# Patient Record
Sex: MALE | Race: BLACK OR AFRICAN AMERICAN | NOT HISPANIC OR LATINO | URBAN - METROPOLITAN AREA
[De-identification: names, ages, dates, MRNs, and addresses within clinical notes are randomized per-mention and may not be internally consistent; named-entity substitution may affect disease eponyms.]

---

## 2019-02-04 ENCOUNTER — EMERGENCY (EMERGENCY)
Facility: HOSPITAL | Age: 19
LOS: 1 days | Discharge: HOME | End: 2019-02-04
Attending: EMERGENCY MEDICINE
Payer: COMMERCIAL

## 2019-02-04 VITALS
DIASTOLIC BLOOD PRESSURE: 84 MMHG | TEMPERATURE: 99 F | SYSTOLIC BLOOD PRESSURE: 125 MMHG | RESPIRATION RATE: 16 BRPM | OXYGEN SATURATION: 98 % | HEART RATE: 79 BPM

## 2019-02-04 VITALS
RESPIRATION RATE: 18 BRPM | OXYGEN SATURATION: 99 % | HEIGHT: 67 IN | TEMPERATURE: 98 F | HEART RATE: 88 BPM | SYSTOLIC BLOOD PRESSURE: 123 MMHG | WEIGHT: 138.01 LBS | DIASTOLIC BLOOD PRESSURE: 87 MMHG

## 2019-02-04 PROBLEM — Z00.00 ENCOUNTER FOR PREVENTIVE HEALTH EXAMINATION: Status: ACTIVE | Noted: 2019-02-04

## 2019-02-04 PROCEDURE — 99283 EMERGENCY DEPT VISIT LOW MDM: CPT | Mod: 25

## 2019-02-04 PROCEDURE — 99053 MED SERV 10PM-8AM 24 HR FAC: CPT

## 2019-02-04 PROCEDURE — 99283 EMERGENCY DEPT VISIT LOW MDM: CPT

## 2019-02-04 RX ORDER — ONDANSETRON 8 MG/1
4 TABLET, FILM COATED ORAL ONCE
Qty: 0 | Refills: 0 | Status: COMPLETED | OUTPATIENT
Start: 2019-02-04 | End: 2019-02-04

## 2019-02-04 RX ORDER — ONDANSETRON 8 MG/1
1 TABLET, FILM COATED ORAL
Qty: 9 | Refills: 0 | OUTPATIENT
Start: 2019-02-04 | End: 2019-02-06

## 2019-02-04 RX ADMIN — ONDANSETRON 4 MILLIGRAM(S): 8 TABLET, FILM COATED ORAL at 03:17

## 2019-02-04 NOTE — ED ADULT NURSE NOTE - OBJECTIVE STATEMENT
18 year old male presents to the ED via walk in with c/o abd pain. Pt states he ate "3 month old chicken" on friday, and has been having nausea and vomiting ever since. Pt vomited approx 4 x this weekend with fever and chills and not tolerating much PO intake. Abd is soft, nondistended and non tender upon exam. Denies c/o CP, SOB, back pain, HA or dizziness. Comfort and safety measures maintained.

## 2019-02-04 NOTE — ED PROVIDER NOTE - ATTENDING CONTRIBUTION TO CARE
18 yom no pmhx no surg hx p/w few days of n/v/d - states sxs have mostly resolved however his concern is that he is still having mild abd bloating and diminished appetite/ states 'my stomach doesn't feel normal when I try to eat and i'm concerned i'm not getting enough calories.' no f/c. no ongoing vomiting or diarrhea    ROS:   constitutional - no fever, no chills  eyes - no visual changes, no redness  eent - no sore throat, no nasal congestion  cvs - no chest pain, no leg swelling  resp - no shortness of breath, no cough  gi - no abdominal pain, + resolved vomiting, + resolved diarrhea  gu - no dysuria, no hematuria  msk - no acute back pain, no joint swelling  skin - no rashes, no jaundice  neuro - no headache, no focal weakness  psych - no acute mental health issue     Physical Exam:   constitutional - well appearing, awake and alert, oriented x3  head - no external evidence of trauma  cvs - rrr, no murmurs, no peripheral edema  resp - breath sounds clear and equal bilat  gi - abdomen soft and nontender, no rigidity, guarding or rebound, bowel sounds present  msk - moving all extremities spontaneously  neuro - alert and oriented x3, no focal deficits, CNs 2-12 grossly intact  skin- no jaundice, warm and dry  psych - mood and affect wnl, no apparent risk to self or others     pt very well appearing ; likely mild residual GI irritation from recent bout of gastroent - pt states he's been eating rotisserie chicken; counseled re need for bland diet and fluids, reassurance. additional verbal instructions regarding diagnosis, return precautions and follow up plan given to pt and/or family. JOHN Julien MD

## 2019-02-04 NOTE — ED PROVIDER NOTE - OBJECTIVE STATEMENT
18M presenting with abd discomfort/ bloating. Pt endorsed to n/v/d since Friday (about 15 episodes of watery, non bloody diarrhea per day and about 5 episodes of vomiting, NBNB per day). Pt came today because he continues to have nausea and still not tolerating PO. Pt endorsed to being bloated / abd discomfort with food. No fever endorsed. No pain endorsed in RLQ or RUQ area.

## 2019-02-04 NOTE — ED PROVIDER NOTE - MEDICAL DECISION MAKING DETAILS
18M with recent GI sxs including n/v/d since Friday, now complaining of nausea and abd bloating. LBM was yesterday. Physical exam not concerned for acute cholecystitis or appendicitis. Will manage nausea and PO challenge.

## 2019-02-04 NOTE — ED PROVIDER NOTE - NSFOLLOWUPINSTRUCTIONS_ED_ALL_ED_FT
- Take Zofran as needed for nausea  - Start light diet such as chicken soup, rice, banana.   - Return to the ED with any worsening of the symptoms.   - Hydrate well by drinking plenty of water.

## 2019-04-18 ENCOUNTER — EMERGENCY (EMERGENCY)
Facility: HOSPITAL | Age: 19
LOS: 1 days | Discharge: HOME | End: 2019-04-18
Attending: EMERGENCY MEDICINE
Payer: COMMERCIAL

## 2019-04-18 VITALS
OXYGEN SATURATION: 99 % | RESPIRATION RATE: 20 BRPM | WEIGHT: 139.99 LBS | SYSTOLIC BLOOD PRESSURE: 144 MMHG | HEART RATE: 100 BPM | TEMPERATURE: 99 F | DIASTOLIC BLOOD PRESSURE: 87 MMHG | HEIGHT: 66 IN

## 2019-04-18 LAB
ANION GAP SERPL CALC-SCNC: 12 MMOL/L — SIGNIFICANT CHANGE UP (ref 5–17)
APPEARANCE UR: CLEAR — SIGNIFICANT CHANGE UP
BACTERIA # UR AUTO: 0 — SIGNIFICANT CHANGE UP
BASOPHILS # BLD AUTO: 0.1 K/UL — SIGNIFICANT CHANGE UP (ref 0–0.2)
BASOPHILS NFR BLD AUTO: 0.8 % — SIGNIFICANT CHANGE UP (ref 0–2)
BILIRUB UR-MCNC: NEGATIVE — SIGNIFICANT CHANGE UP
BUN SERPL-MCNC: 12 MG/DL — SIGNIFICANT CHANGE UP (ref 7–23)
CALCIUM SERPL-MCNC: 9.4 MG/DL — SIGNIFICANT CHANGE UP (ref 8.4–10.5)
CHLORIDE SERPL-SCNC: 99 MMOL/L — SIGNIFICANT CHANGE UP (ref 96–108)
CO2 SERPL-SCNC: 26 MMOL/L — SIGNIFICANT CHANGE UP (ref 22–31)
COLOR SPEC: SIGNIFICANT CHANGE UP
CREAT SERPL-MCNC: 0.95 MG/DL — SIGNIFICANT CHANGE UP (ref 0.5–1.3)
DIFF PNL FLD: NEGATIVE — SIGNIFICANT CHANGE UP
EOSINOPHIL # BLD AUTO: 0.1 K/UL — SIGNIFICANT CHANGE UP (ref 0–0.5)
EOSINOPHIL NFR BLD AUTO: 1.7 % — SIGNIFICANT CHANGE UP (ref 0–6)
EPI CELLS # UR: 0 /HPF — SIGNIFICANT CHANGE UP
GLUCOSE SERPL-MCNC: 104 MG/DL — HIGH (ref 70–99)
GLUCOSE UR QL: NEGATIVE — SIGNIFICANT CHANGE UP
HCT VFR BLD CALC: 45.7 % — SIGNIFICANT CHANGE UP (ref 39–50)
HGB BLD-MCNC: 15.6 G/DL — SIGNIFICANT CHANGE UP (ref 13–17)
HYALINE CASTS # UR AUTO: 0 /LPF — SIGNIFICANT CHANGE UP (ref 0–2)
KETONES UR-MCNC: ABNORMAL
LEUKOCYTE ESTERASE UR-ACNC: NEGATIVE — SIGNIFICANT CHANGE UP
LYMPHOCYTES # BLD AUTO: 2.5 K/UL — SIGNIFICANT CHANGE UP (ref 1–3.3)
LYMPHOCYTES # BLD AUTO: 35.8 % — SIGNIFICANT CHANGE UP (ref 13–44)
MCHC RBC-ENTMCNC: 29.8 PG — SIGNIFICANT CHANGE UP (ref 27–34)
MCHC RBC-ENTMCNC: 34.1 GM/DL — SIGNIFICANT CHANGE UP (ref 32–36)
MCV RBC AUTO: 87.4 FL — SIGNIFICANT CHANGE UP (ref 80–100)
MONOCYTES # BLD AUTO: 0.5 K/UL — SIGNIFICANT CHANGE UP (ref 0–0.9)
MONOCYTES NFR BLD AUTO: 7.1 % — SIGNIFICANT CHANGE UP (ref 2–14)
NEUTROPHILS # BLD AUTO: 3.9 K/UL — SIGNIFICANT CHANGE UP (ref 1.8–7.4)
NEUTROPHILS NFR BLD AUTO: 54.6 % — SIGNIFICANT CHANGE UP (ref 43–77)
NITRITE UR-MCNC: NEGATIVE — SIGNIFICANT CHANGE UP
PH UR: 6 — SIGNIFICANT CHANGE UP (ref 5–8)
PLATELET # BLD AUTO: 270 K/UL — SIGNIFICANT CHANGE UP (ref 150–400)
POTASSIUM SERPL-MCNC: 3.6 MMOL/L — SIGNIFICANT CHANGE UP (ref 3.5–5.3)
POTASSIUM SERPL-SCNC: 3.6 MMOL/L — SIGNIFICANT CHANGE UP (ref 3.5–5.3)
PROT UR-MCNC: SIGNIFICANT CHANGE UP
RBC # BLD: 5.23 M/UL — SIGNIFICANT CHANGE UP (ref 4.2–5.8)
RBC # FLD: 12.3 % — SIGNIFICANT CHANGE UP (ref 10.3–14.5)
RBC CASTS # UR COMP ASSIST: 1 /HPF — SIGNIFICANT CHANGE UP (ref 0–4)
SODIUM SERPL-SCNC: 137 MMOL/L — SIGNIFICANT CHANGE UP (ref 135–145)
SP GR SPEC: 1.02 — SIGNIFICANT CHANGE UP (ref 1.01–1.02)
UROBILINOGEN FLD QL: NEGATIVE — SIGNIFICANT CHANGE UP
WBC # BLD: 7.1 K/UL — SIGNIFICANT CHANGE UP (ref 3.8–10.5)
WBC # FLD AUTO: 7.1 K/UL — SIGNIFICANT CHANGE UP (ref 3.8–10.5)
WBC UR QL: 1 /HPF — SIGNIFICANT CHANGE UP (ref 0–5)

## 2019-04-18 PROCEDURE — 81001 URINALYSIS AUTO W/SCOPE: CPT

## 2019-04-18 PROCEDURE — 99284 EMERGENCY DEPT VISIT MOD MDM: CPT

## 2019-04-18 PROCEDURE — 87591 N.GONORRHOEAE DNA AMP PROB: CPT

## 2019-04-18 PROCEDURE — 85027 COMPLETE CBC AUTOMATED: CPT

## 2019-04-18 PROCEDURE — 99283 EMERGENCY DEPT VISIT LOW MDM: CPT | Mod: 25

## 2019-04-18 PROCEDURE — 87086 URINE CULTURE/COLONY COUNT: CPT

## 2019-04-18 PROCEDURE — 87491 CHLMYD TRACH DNA AMP PROBE: CPT

## 2019-04-18 PROCEDURE — 80048 BASIC METABOLIC PNL TOTAL CA: CPT

## 2019-04-18 NOTE — ED PROVIDER NOTE - NS ED ROS FT
General: no fevers or chills  Head: no headache  Eyes: no vision change  ENT: no neck pain  CV: no chest pain  Resp: no SOB, no cough  GI: +1 episode nbnb emesis, 1 episode loose stool, +suprapubic pain  : +frequency, no rash, no discharge, no dysuria  MSK: no joint pain, no back pain  Skin: no rash  Neuro: no focal weakness, no change in sensation

## 2019-04-18 NOTE — ED ADULT NURSE NOTE - OBJECTIVE STATEMENT
18 year old male presents to the ED via walk in with c/o suprapubic pain x 2 days. Patient states he was seen by a urologist and started on doxycycline, but is  in suprapubic area with urinary frequency. Denies c/o hematuria, CP, SOB, N/V/D at this time. Comfort and safety measures maintained.

## 2019-04-18 NOTE — ED ADULT NURSE REASSESSMENT NOTE - NS ED NURSE REASSESS COMMENT FT1
Pt states he had a successful urination, unsure of amount but states it was more than a few drops like it has been. Post void-bladder scan completed, 56cc noted.

## 2019-04-18 NOTE — ED PROVIDER NOTE - NSFOLLOWUPINSTRUCTIONS_ED_ALL_ED_FT
Take your bactrim antibiotic as prescribed by your doctor and follow up with your urologist within 24 hours. Stay hydrated by drinking plenty of water. Take Motrin and/or Tylenol as needed for pain. Follow the dosing instructions on the packaging. Return to the ER promptly for any urgent concerns.

## 2019-04-18 NOTE — ED PROVIDER NOTE - OBJECTIVE STATEMENT
19yo man presents with increase urinary frequency and suprapubic pain x 4 days with worsening symptoms x 1 day. Pt reports he had intermittent perineal discomfort over the past months and was seen by a urologist 6 days ago and was evaluated with ultrasound of testes, bladder, had prostate measured, and urine tested with no findings. He was sent home with doxy BID and when he started the doxy 4 days ago, he began to have urinary frequency and suprapubic pain. He also had an episode of nbnb emesis and 1 loose stool after starting doxy but has not had any since the initial dose. Pt reports pain worsened yesterday and called urology who switched his antibiotics to bactrim but he has not started taking them. He denies hematuria or flank pain. No fevers or chills. He is sexually active with 1 partner and consistently uses protection. He has not noticed any pain in testes and no overlying rash on genitals.

## 2019-04-18 NOTE — ED PROVIDER NOTE - ATTENDING CONTRIBUTION TO CARE
18M, no sig pmh, with urinary frequency, suprapubic pain x 4 days. Seen by urologist several days ago, evaluated with US, had urine tested, d/c'd with doxycycline. Pt subsequently develoepd suprapubic pain, then was changed to bactrim by urologist. One episode nbnb vomiting, one loose stool, no melena, heatochezia. No hematuria. No flank pain, hematuria, f/c, cough, congestion, cp, sob. No penile d/c, no hx std, no rash, sexually active with one female partner.     PE: NAD, NCAT, MMM, Trachea midline, Normal conjunctiva, lungs CTAB, S1/S2 RRR, Normal perfusion, 2+ radial pulses bilat, Abdomen Soft, Minimal suprapubic ttp, No rebound/guarding, No LE edema, No deformity of extremities, No rashes,  No focal motor or sensory deficits.    Rectal exam revealed ?enlarged prostate but no ttp. Ddx includes but not limited to cystitis, much lower suspicion kidney stone, prostatitis. No ttp around mcburney's point, ttp only suprapubic, very low suspicion appendicitis, diverticulitis, or other acute gi pathology. Plan to obtain labs eval for leukocytosis, metabolic disturbance, gyn/chlamydia screen. Plan for likely d/c with outpatient urology f/u. - Patrice Levy MD

## 2019-04-18 NOTE — ED PROVIDER NOTE - CLINICAL SUMMARY MEDICAL DECISION MAKING FREE TEXT BOX
19yo man presents with increased urinary frequency with sensation of inability to completely void with suprapubic tenderness on exam with normal urologic evaluation done 6 days ago on antibiotics. Will recheck urine for possible cystitis. Will continue to monitor and reassess.

## 2019-04-18 NOTE — ED PROVIDER NOTE - PHYSICAL EXAMINATION
General: well-appearing young man in no acute distress  Head: normocephalic, atraumatic  Eyes: PERRL  Mouth: moist mucous membranes  Neck: supple neck  CV: normal rate and rhythm  Respiratory: clear to auscultation bilaterally  Abdomen: suprapubic tenderness to palpation, soft, nondistended  :  Back: no midline tenderness to palpation, no CVAT  Neuro: alert and oriented x3, speech clear, moving all extremities  Skin:   Extremities: no edema, peripheral pulses 2+ bilaterally General: well-appearing young man in no acute distress  Head: normocephalic, atraumatic  Eyes: PERRL  Mouth: moist mucous membranes  Neck: supple neck  CV: normal rate and rhythm  Respiratory: clear to auscultation bilaterally  Abdomen: suprapubic tenderness to palpation, soft, nondistended  : with Mildred and Ruddy, circumcised, no inguinal hernia, no testicular tenderness to palpation, elicited cremasteric reflex bilaterally  Rectal: with Milderd and Ruddy, enlarged prostate on exam and firm and no irregularities  Back: no midline tenderness to palpation, no CVAT  Neuro: alert and oriented x3, speech clear, moving all extremities  Skin: no rash on genitals or abdomen  Extremities: no edema, peripheral pulses 2+ bilaterally

## 2019-04-19 VITALS
DIASTOLIC BLOOD PRESSURE: 77 MMHG | TEMPERATURE: 99 F | OXYGEN SATURATION: 98 % | SYSTOLIC BLOOD PRESSURE: 111 MMHG | RESPIRATION RATE: 16 BRPM | HEART RATE: 74 BPM

## 2019-04-19 LAB
C TRACH RRNA SPEC QL NAA+PROBE: SIGNIFICANT CHANGE UP
CULTURE RESULTS: NO GROWTH — SIGNIFICANT CHANGE UP
N GONORRHOEA RRNA SPEC QL NAA+PROBE: SIGNIFICANT CHANGE UP
SPECIMEN SOURCE: SIGNIFICANT CHANGE UP
SPECIMEN SOURCE: SIGNIFICANT CHANGE UP

## 2020-09-28 ENCOUNTER — EMERGENCY (EMERGENCY)
Facility: HOSPITAL | Age: 20
LOS: 1 days | Discharge: HOME | End: 2020-09-28
Attending: STUDENT IN AN ORGANIZED HEALTH CARE EDUCATION/TRAINING PROGRAM
Payer: COMMERCIAL

## 2020-09-28 VITALS
TEMPERATURE: 98 F | HEART RATE: 95 BPM | RESPIRATION RATE: 16 BRPM | OXYGEN SATURATION: 100 % | SYSTOLIC BLOOD PRESSURE: 131 MMHG | DIASTOLIC BLOOD PRESSURE: 84 MMHG

## 2020-09-28 VITALS
OXYGEN SATURATION: 100 % | HEIGHT: 66 IN | SYSTOLIC BLOOD PRESSURE: 123 MMHG | RESPIRATION RATE: 16 BRPM | WEIGHT: 139.99 LBS | TEMPERATURE: 99 F | HEART RATE: 102 BPM | DIASTOLIC BLOOD PRESSURE: 86 MMHG

## 2020-09-28 PROCEDURE — 12011 RPR F/E/E/N/L/M 2.5 CM/<: CPT

## 2020-09-28 PROCEDURE — 73090 X-RAY EXAM OF FOREARM: CPT

## 2020-09-28 PROCEDURE — 70450 CT HEAD/BRAIN W/O DYE: CPT | Mod: 26

## 2020-09-28 PROCEDURE — 73130 X-RAY EXAM OF HAND: CPT | Mod: 26,RT

## 2020-09-28 PROCEDURE — 99284 EMERGENCY DEPT VISIT MOD MDM: CPT | Mod: 25

## 2020-09-28 PROCEDURE — 73110 X-RAY EXAM OF WRIST: CPT | Mod: 26,RT

## 2020-09-28 PROCEDURE — 73130 X-RAY EXAM OF HAND: CPT

## 2020-09-28 PROCEDURE — 73080 X-RAY EXAM OF ELBOW: CPT

## 2020-09-28 PROCEDURE — 73090 X-RAY EXAM OF FOREARM: CPT | Mod: 26,LT

## 2020-09-28 PROCEDURE — 99285 EMERGENCY DEPT VISIT HI MDM: CPT | Mod: 25

## 2020-09-28 PROCEDURE — 73080 X-RAY EXAM OF ELBOW: CPT | Mod: 26,LT

## 2020-09-28 PROCEDURE — 73110 X-RAY EXAM OF WRIST: CPT

## 2020-09-28 PROCEDURE — 70450 CT HEAD/BRAIN W/O DYE: CPT

## 2020-09-28 RX ORDER — ACETAMINOPHEN 500 MG
975 TABLET ORAL ONCE
Refills: 0 | Status: COMPLETED | OUTPATIENT
Start: 2020-09-28 | End: 2020-09-28

## 2020-09-28 NOTE — ED PROVIDER NOTE - SKIN [+], MLM
ABRASION/Lacs: L upper eyelid and L cheek Abrasions: L shoulder/elbow, b/l knees/thighs, R thenar eminence

## 2020-09-28 NOTE — ED PROVIDER NOTE - PHYSICAL EXAMINATION
GEN: Pt non-toxic in NAD, A&Ox3.  PSYCH: Affect and mood appropriate.  EYES: Sclera white w/o injection, EOMI, PERRLA.   ENT: Right sided hematoma noted over the temporal/parietal region. Nose w/o deformity. No auricular TTP. MMM. Neck supple FROM. Airway patent.  RESP: No chest wall tenderness, CTA b/l, no wheezes, rales, or rhonchi.   CARDIAC: RRR, clear distinct S1, S2, no appreciable murmurs.  ABD: Abdomen soft, non-tender. No CVAT b/l.  VASC: Radial and dorsalis pedis pulses 2+ b/l. No edema or calf tenderness.  SKIN: 1.5 cm linear laceration noted beneath the left eyebrow, no active bleeding noted. Abrasions noted at the left eyebrow, left zygoma, left posterior shoulder, left elbow, left forearm, and b/l thighs. No ecchymosis or rashes noted throughout.  Neuro: CN II-XII intact. No focal neurological deficits. Gait normal. Strength 5/5 in the upper and lower extremities b/l. Sensation intact.  MSK: TTP over the R first metacarpal. No scaphoid TTP. No erythema, ecchymosis, or edema noted over the R first metacarpal and thumb. R thumb decreased ROM with adduction. Right thumb full ROM with opposition, flexion and abduction. TTP over the left olecranon and left lateral epicondyle. Mild edema noted over the lateral forearm. Full ROM in the b/l UE and LE w/o pain. No obvious deformities noted. No obvious spinal deformity, no midline spinal ttp, no step-offs.
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
## 2020-09-28 NOTE — ED PROVIDER NOTE - NSFOLLOWUPINSTRUCTIONS_ED_ALL_ED_FT
Follow up with your primary medical doctor in 1-2 day.    Keep sutures dry for 24 hours then clean gently with soap and warm water.    Apply bacitracin ointment twice a day for 5 days. Sunscreen when outdoors.  Return to the ER in 5-7 days for suture removal.    Return to the ER for any persistent/worsening symptoms or if you experience any new symptoms such as fever, redness, numbness , you notice a new rash, increased irritation, abnormal discharge, or any concerning symptoms.

## 2020-09-28 NOTE — ED ADULT NURSE NOTE - NSIMPLEMENTINTERV_GEN_ALL_ED
Implemented All Fall Risk Interventions:  [place] to call system. Call bell, personal items and telephone within reach. Instruct patient to call for assistance. Room bathroom lighting operational. Non-slip footwear when patient is off stretcher. Physically safe environment: no spills, clutter or unnecessary equipment. Stretcher in lowest position, wheels locked, appropriate side rails in place. Provide visual cue, wrist band, yellow gown, etc. Monitor gait and stability. Monitor for mental status changes and reorient to person, place, and time. Review medications for side effects contributing to fall risk. Reinforce activity limits and safety measures with patient and family.

## 2020-09-28 NOTE — ED ADULT NURSE NOTE - NS_ED_NURSE_TEACHING_TOPIC_ED_A_ED
Wound care/after 24hrs, clean sutures/wound with warm soap and water; return to ED after 5 days for suture removal

## 2020-09-28 NOTE — ED PROVIDER NOTE - PATIENT PORTAL LINK FT
You can access the FollowMyHealth Patient Portal offered by [facility] by registering at the following website: [email or web address]. By joining [facility]’s FollowMyHealth portal, you will also be able to view your health information using other applications (apps) compatible with our system.
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
## 2020-09-28 NOTE — ED PROCEDURE NOTE - CPROC ED LACER REPAIR DETAIL1
The wound was explored to base in bloodless field.
The wound was explored to base in bloodless field./All foreign material was removed.

## 2021-01-12 NOTE — ED ADULT NURSE NOTE - IS THE PATIENT ABLE TO BE SCREENED?
Routing refill request to provider for review/approval because:  Patient needs to be seen because:  Needs to establish care with provider for further refills    EFRAIN ManningN, RN  [facility]       Yes

## 2023-11-21 NOTE — ED PROVIDER NOTE - CHIEF COMPLAINT
The patient is a 18y Male complaining of suprapubic pain.
0 (no pain/absence of nonverbal indicators of pain)

## 2024-09-19 NOTE — ED PROVIDER NOTE - PHYSICAL EXAMINATION
[Alert] : alert [Sclera] : the sclera and conjunctiva were normal [Hearing Threshold Finger Rub Not [place]] : hearing was normal [Normal Appearance] : the appearance of the neck was normal No guarding or rebound  No pain with palpation on RUQ or RLQ abd  Mild discomfort with palpation

## 2024-10-18 NOTE — ED ADULT NURSE NOTE - PRO INTERPRETER NEED 2
normal appearance , without tenderness upon palpation , no deformities , trachea midline , Thyroid normal size , no thyroid nodules , no masses , no JVD , thyroid nontender English